# Patient Record
(demographics unavailable — no encounter records)

---

## 2025-04-02 NOTE — PHYSICAL EXAM
[Urethral Meatus] : meatus normal [Penis Abnormality] : normal circumcised penis [Urinary Bladder Findings] : the bladder was normal on palpation [Scrotum] : the scrotum was normal [de-identified] : grade II left, grade I right. 18cc bilateral testis.

## 2025-04-02 NOTE — PHYSICAL EXAM
[Urethral Meatus] : meatus normal [Penis Abnormality] : normal circumcised penis [Urinary Bladder Findings] : the bladder was normal on palpation [Scrotum] : the scrotum was normal [de-identified] : grade II left, grade I right. 18cc bilateral testis.

## 2025-04-02 NOTE — HISTORY OF PRESENT ILLNESS
[FreeTextEntry1] : 20M  3-4 weeks ago testicular pain left side went to [facility] underwent sonogram told has a left variccoele seen by urologist was told he has a varicocele and should treat sono at [facility] 19cc left, 22cc right testis +left varicocele

## 2025-04-02 NOTE — ASSESSMENT
[FreeTextEntry1] : testicular pain +bilateral varicocele on exam no clear indicaction for varicocelecotmy no attempt at conception TT FSH LH E2 unalbe to obtain SA if future failure to conceive for SA

## 2025-04-02 NOTE — HISTORY OF PRESENT ILLNESS
[FreeTextEntry1] : 20M  3-4 weeks ago testicular pain left side went to [facility] underwent sonogram told has a left variccoele seen by urologist was told he has a varicocele and should treat sono at [facility] 19cc left, 22cc right testis +left varicocele